# Patient Record
Sex: FEMALE
[De-identification: names, ages, dates, MRNs, and addresses within clinical notes are randomized per-mention and may not be internally consistent; named-entity substitution may affect disease eponyms.]

---

## 2020-10-12 ENCOUNTER — LAB REQUISITION (OUTPATIENT)
Dept: ADMINISTRATIVE | Age: 25
End: 2020-10-12
Payer: COMMERCIAL

## 2020-10-12 DIAGNOSIS — R45.86 MOOD ALTERED: ICD-10-CM

## 2020-10-12 DIAGNOSIS — F39 MOOD DISORDER (HCC): ICD-10-CM

## 2020-10-12 PROCEDURE — 82306 VITAMIN D 25 HYDROXY: CPT | Performed by: NURSE PRACTITIONER

## 2020-10-12 PROCEDURE — 36415 COLL VENOUS BLD VENIPUNCTURE: CPT | Performed by: NURSE PRACTITIONER

## 2020-10-12 PROCEDURE — 84100 ASSAY OF PHOSPHORUS: CPT | Performed by: NURSE PRACTITIONER

## 2020-10-12 PROCEDURE — 82607 VITAMIN B-12: CPT | Performed by: NURSE PRACTITIONER

## 2020-10-12 PROCEDURE — 83735 ASSAY OF MAGNESIUM: CPT | Performed by: NURSE PRACTITIONER

## 2020-10-12 PROCEDURE — 81001 URINALYSIS AUTO W/SCOPE: CPT | Performed by: OTHER

## 2020-10-12 PROCEDURE — 85025 COMPLETE CBC W/AUTO DIFF WBC: CPT | Performed by: NURSE PRACTITIONER

## 2020-10-12 PROCEDURE — 80053 COMPREHEN METABOLIC PANEL: CPT | Performed by: NURSE PRACTITIONER

## 2020-10-12 PROCEDURE — 84443 ASSAY THYROID STIM HORMONE: CPT | Performed by: NURSE PRACTITIONER

## 2020-10-12 PROCEDURE — 82150 ASSAY OF AMYLASE: CPT | Performed by: NURSE PRACTITIONER

## 2020-10-14 PROCEDURE — 85060 BLOOD SMEAR INTERPRETATION: CPT | Performed by: NURSE PRACTITIONER

## 2020-11-02 ENCOUNTER — LAB REQUISITION (OUTPATIENT)
Dept: ADMINISTRATIVE | Age: 25
End: 2020-11-02
Payer: COMMERCIAL

## 2020-11-02 DIAGNOSIS — E61.2 MAGNESIUM DEFICIENCY: ICD-10-CM

## 2020-11-04 PROCEDURE — 36415 COLL VENOUS BLD VENIPUNCTURE: CPT | Performed by: NURSE PRACTITIONER

## 2020-11-04 PROCEDURE — 80048 BASIC METABOLIC PNL TOTAL CA: CPT | Performed by: NURSE PRACTITIONER

## 2020-11-04 PROCEDURE — 83735 ASSAY OF MAGNESIUM: CPT | Performed by: NURSE PRACTITIONER

## 2020-11-04 PROCEDURE — 84100 ASSAY OF PHOSPHORUS: CPT | Performed by: NURSE PRACTITIONER

## 2023-04-04 ENCOUNTER — APPOINTMENT (OUTPATIENT)
Dept: PRIMARY CARE | Facility: CLINIC | Age: 28
End: 2023-04-04
Payer: MEDICAID

## 2023-04-25 ENCOUNTER — TELEPHONE (OUTPATIENT)
Dept: PRIMARY CARE | Facility: CLINIC | Age: 28
End: 2023-04-25
Payer: MEDICAID

## 2023-04-25 NOTE — TELEPHONE ENCOUNTER
Pt left a msg stating that  no longer accepts her insurance.  She said that she is in search of a new provider, but will run out of her Metoprolol before that happens, and is asking for a refill.  Pharm is [facility] [place].

## 2023-07-11 ENCOUNTER — TELEPHONE (OUTPATIENT)
Dept: PRIMARY CARE | Facility: CLINIC | Age: 28
End: 2023-07-11
Payer: MEDICAID

## 2023-07-11 NOTE — TELEPHONE ENCOUNTER
Pt's pharmacy, [facility], left a msg asking for a refill of the pt's Sertraline 100mg, #180, take 2 daily.

## 2023-07-18 DIAGNOSIS — F32.A DEPRESSION, UNSPECIFIED DEPRESSION TYPE: ICD-10-CM

## 2023-07-18 RX ORDER — SERTRALINE HYDROCHLORIDE 100 MG/1
200 TABLET, FILM COATED ORAL DAILY
COMMUNITY
End: 2023-07-18 | Stop reason: SDUPTHER

## 2023-07-18 RX ORDER — SERTRALINE HYDROCHLORIDE 100 MG/1
200 TABLET, FILM COATED ORAL DAILY
Qty: 180 TABLET | Refills: 0 | Status: SHIPPED | OUTPATIENT
Start: 2023-07-18 | End: 2023-10-16

## 2023-07-18 NOTE — TELEPHONE ENCOUNTER
Requested refill placed into the system under office policy and sent to Dr Alfaro to review and send

## 2024-05-20 DIAGNOSIS — B99.9 RECURRENT INFECTIONS: Primary | ICD-10-CM

## 2024-06-11 ENCOUNTER — LAB (OUTPATIENT)
Dept: LAB | Facility: LAB | Age: 29
End: 2024-06-11
Payer: MEDICAID

## 2024-06-11 DIAGNOSIS — B99.9 RECURRENT INFECTIONS: ICD-10-CM

## 2024-06-11 PROCEDURE — 82784 ASSAY IGA/IGD/IGG/IGM EACH: CPT

## 2024-06-11 PROCEDURE — 82785 ASSAY OF IGE: CPT

## 2024-06-11 PROCEDURE — 83520 IMMUNOASSAY QUANT NOS NONAB: CPT

## 2024-06-11 PROCEDURE — 36415 COLL VENOUS BLD VENIPUNCTURE: CPT

## 2024-06-11 PROCEDURE — 85025 COMPLETE CBC W/AUTO DIFF WBC: CPT

## 2024-06-11 PROCEDURE — 86317 IMMUNOASSAY INFECTIOUS AGENT: CPT

## 2024-06-12 LAB
BASOPHILS # BLD AUTO: 0.03 X10*3/UL (ref 0–0.1)
BASOPHILS NFR BLD AUTO: 0.3 %
EOSINOPHIL # BLD AUTO: 0.07 X10*3/UL (ref 0–0.7)
EOSINOPHIL NFR BLD AUTO: 0.8 %
ERYTHROCYTE [DISTWIDTH] IN BLOOD BY AUTOMATED COUNT: 12.9 % (ref 11.5–14.5)
HCT VFR BLD AUTO: 39.6 % (ref 36–46)
HGB BLD-MCNC: 13.1 G/DL (ref 12–16)
IGA SERPL-MCNC: 215 MG/DL (ref 70–400)
IGE SERPL-ACNC: 18 IU/ML (ref 0–214)
IGG SERPL-MCNC: 900 MG/DL (ref 700–1600)
IGM SERPL-MCNC: 149 MG/DL (ref 40–230)
IMM GRANULOCYTES # BLD AUTO: 0.03 X10*3/UL (ref 0–0.7)
IMM GRANULOCYTES NFR BLD AUTO: 0.3 % (ref 0–0.9)
LYMPHOCYTES # BLD AUTO: 2.32 X10*3/UL (ref 1.2–4.8)
LYMPHOCYTES NFR BLD AUTO: 26.9 %
MCH RBC QN AUTO: 28.2 PG (ref 26–34)
MCHC RBC AUTO-ENTMCNC: 33.1 G/DL (ref 32–36)
MCV RBC AUTO: 85 FL (ref 80–100)
MONOCYTES # BLD AUTO: 0.47 X10*3/UL (ref 0.1–1)
MONOCYTES NFR BLD AUTO: 5.4 %
NEUTROPHILS # BLD AUTO: 5.72 X10*3/UL (ref 1.2–7.7)
NEUTROPHILS NFR BLD AUTO: 66.3 %
NRBC BLD-RTO: 0 /100 WBCS (ref 0–0)
PLATELET # BLD AUTO: 245 X10*3/UL (ref 150–450)
RBC # BLD AUTO: 4.65 X10*6/UL (ref 4–5.2)
WBC # BLD AUTO: 8.6 X10*3/UL (ref 4.4–11.3)

## 2024-06-13 LAB — MANNOSE-BP SER-MCNC: <40 NG/ML

## 2024-06-18 LAB
S PN DA SERO 19F IGG SER-MCNC: 2.05 UG/ML
S PNEUM DA 1 IGG SER-MCNC: 0.89 UG/ML
S PNEUM DA 10A IGG SER-MCNC: 0.39 UG/ML
S PNEUM DA 11A IGG SER-MCNC: 0.56 UG/ML
S PNEUM DA 12F IGG SER-MCNC: 0.13 UG/ML
S PNEUM DA 14 IGG SER-MCNC: 0.11 UG/ML
S PNEUM DA 15B IGG SER-MCNC: 0.25 UG/ML
S PNEUM DA 17F IGG SER-MCNC: 2.94 UG/ML
S PNEUM DA 18C IGG SER-MCNC: 0.16 UG/ML
S PNEUM DA 19A IGG SER-MCNC: 1.51 UG/ML
S PNEUM DA 2 IGG SER-MCNC: 0.27 UG/ML
S PNEUM DA 20A IGG SER-MCNC: 0.49 UG/ML
S PNEUM DA 22F IGG SER-MCNC: 0.71 UG/ML
S PNEUM DA 23F IGG SER-MCNC: 0.65 UG/ML
S PNEUM DA 3 IGG SER-MCNC: 0.89 UG/ML
S PNEUM DA 33F IGG SER-MCNC: 3.7 UG/ML
S PNEUM DA 4 IGG SER-MCNC: 0.47 UG/ML
S PNEUM DA 5 IGG SER-MCNC: 2.04 UG/ML
S PNEUM DA 6B IGG SER-MCNC: 0.48 UG/ML
S PNEUM DA 7F IGG SER-MCNC: 0.18 UG/ML
S PNEUM DA 8 IGG SER-MCNC: 0.31 UG/ML
S PNEUM DA 9N IGG SER-MCNC: 0.44 UG/ML
S PNEUM DA 9V IGG SER-MCNC: 0.55 UG/ML
S PNEUM SEROTYPE IGG SER-IMP: NORMAL

## 2024-06-24 DIAGNOSIS — D89.89 MANNOSE-BINDING LECTIN DEFICIENCY (MULTI): Primary | ICD-10-CM

## 2024-07-20 DIAGNOSIS — F32.A DEPRESSION, UNSPECIFIED DEPRESSION TYPE: ICD-10-CM

## 2024-07-22 RX ORDER — SERTRALINE HYDROCHLORIDE 100 MG/1
200 TABLET, FILM COATED ORAL DAILY
Qty: 180 TABLET | Refills: 0 | OUTPATIENT
Start: 2024-07-22

## 2024-08-24 ENCOUNTER — LAB (OUTPATIENT)
Dept: LAB | Facility: LAB | Age: 29
End: 2024-08-24
Payer: MEDICAID

## 2024-08-24 DIAGNOSIS — D89.89 MANNOSE-BINDING LECTIN DEFICIENCY (MULTI): ICD-10-CM

## 2024-08-24 PROCEDURE — 36415 COLL VENOUS BLD VENIPUNCTURE: CPT

## 2024-08-24 PROCEDURE — 86317 IMMUNOASSAY INFECTIOUS AGENT: CPT

## 2024-08-28 LAB
S PN DA SERO 19F IGG SER-MCNC: 15.39 UG/ML
S PNEUM DA 1 IGG SER-MCNC: >60.19 UG/ML
S PNEUM DA 10A IGG SER-MCNC: 15.4 UG/ML
S PNEUM DA 11A IGG SER-MCNC: >3.45 UG/ML
S PNEUM DA 12F IGG SER-MCNC: 0.53 UG/ML
S PNEUM DA 14 IGG SER-MCNC: 10.35 UG/ML
S PNEUM DA 15B IGG SER-MCNC: 1.57 UG/ML
S PNEUM DA 17F IGG SER-MCNC: >11.68 UG/ML
S PNEUM DA 18C IGG SER-MCNC: 1.35 UG/ML
S PNEUM DA 19A IGG SER-MCNC: 1.8 UG/ML
S PNEUM DA 2 IGG SER-MCNC: 16.6 UG/ML
S PNEUM DA 20A IGG SER-MCNC: 8.84 UG/ML
S PNEUM DA 22F IGG SER-MCNC: 5.17 UG/ML
S PNEUM DA 23F IGG SER-MCNC: 1.79 UG/ML
S PNEUM DA 3 IGG SER-MCNC: 13.26 UG/ML
S PNEUM DA 33F IGG SER-MCNC: >13.39 UG/ML
S PNEUM DA 4 IGG SER-MCNC: 11.22 UG/ML
S PNEUM DA 5 IGG SER-MCNC: >21.21 UG/ML
S PNEUM DA 6B IGG SER-MCNC: 3.1 UG/ML
S PNEUM DA 7F IGG SER-MCNC: 8.07 UG/ML
S PNEUM DA 8 IGG SER-MCNC: 17.05 UG/ML
S PNEUM DA 9N IGG SER-MCNC: >11.04 UG/ML
S PNEUM DA 9V IGG SER-MCNC: >11.73 UG/ML
S PNEUM SEROTYPE IGG SER-IMP: NORMAL

## 2025-02-04 ENCOUNTER — APPOINTMENT (OUTPATIENT)
Dept: URGENT CARE | Age: 30
End: 2025-02-04